# Patient Record
Sex: FEMALE | Race: WHITE | NOT HISPANIC OR LATINO
[De-identification: names, ages, dates, MRNs, and addresses within clinical notes are randomized per-mention and may not be internally consistent; named-entity substitution may affect disease eponyms.]

---

## 2017-03-09 ENCOUNTER — APPOINTMENT (OUTPATIENT)
Dept: OBGYN | Facility: CLINIC | Age: 52
End: 2017-03-09

## 2017-03-09 VITALS
HEIGHT: 64 IN | DIASTOLIC BLOOD PRESSURE: 70 MMHG | BODY MASS INDEX: 22.2 KG/M2 | SYSTOLIC BLOOD PRESSURE: 110 MMHG | WEIGHT: 130 LBS

## 2017-03-11 LAB — HPV HIGH+LOW RISK DNA PNL CVX: NEGATIVE

## 2017-03-13 LAB — CYTOLOGY CVX/VAG DOC THIN PREP: NORMAL

## 2018-04-13 ENCOUNTER — APPOINTMENT (OUTPATIENT)
Dept: OBGYN | Facility: CLINIC | Age: 53
End: 2018-04-13
Payer: COMMERCIAL

## 2018-04-13 ENCOUNTER — APPOINTMENT (OUTPATIENT)
Dept: INTERNAL MEDICINE | Facility: CLINIC | Age: 53
End: 2018-04-13

## 2018-04-13 VITALS
WEIGHT: 132 LBS | HEIGHT: 64 IN | DIASTOLIC BLOOD PRESSURE: 70 MMHG | BODY MASS INDEX: 22.53 KG/M2 | SYSTOLIC BLOOD PRESSURE: 112 MMHG

## 2018-04-13 PROCEDURE — 82270 OCCULT BLOOD FECES: CPT

## 2018-04-13 PROCEDURE — 99396 PREV VISIT EST AGE 40-64: CPT

## 2018-04-16 LAB — HPV HIGH+LOW RISK DNA PNL CVX: NOT DETECTED

## 2018-04-18 LAB — CYTOLOGY CVX/VAG DOC THIN PREP: NORMAL

## 2019-05-02 ENCOUNTER — APPOINTMENT (OUTPATIENT)
Dept: OBGYN | Facility: CLINIC | Age: 54
End: 2019-05-02
Payer: COMMERCIAL

## 2019-05-02 VITALS
HEIGHT: 64 IN | SYSTOLIC BLOOD PRESSURE: 108 MMHG | BODY MASS INDEX: 23.22 KG/M2 | WEIGHT: 136 LBS | DIASTOLIC BLOOD PRESSURE: 56 MMHG

## 2019-05-02 PROCEDURE — 82270 OCCULT BLOOD FECES: CPT

## 2019-05-02 PROCEDURE — 99396 PREV VISIT EST AGE 40-64: CPT

## 2019-05-02 NOTE — PHYSICAL EXAM
[Alert] : alert [Awake] : awake [Soft] : soft [Oriented x3] : oriented to person, place, and time [No Lesions] : no genitalia lesions [Normal] : cervix [Labia Majora] : labia major [No Bleeding] : there was no active vaginal bleeding [Pap Obtained] : a Pap smear was performed [Anteversion] : anteverted [No Tenderness] : no rectal tenderness [Uterine Adnexae] : were not tender and not enlarged [Nl Sphincter Tone] : normal sphincter tone [RRR, No Murmurs] : RRR, no murmurs [CTAB] : CTAB [Acute Distress] : no acute distress [LAD] : no lymphadenopathy [Thyroid Nodule] : no thyroid nodule [Goiter] : no goiter [Mass] : no breast mass [Nipple Discharge] : no nipple discharge [Axillary LAD] : no axillary lymphadenopathy [Tender] : non tender [Distended] : not distended [H/Smegaly] : no hepatosplenomegaly [Depressed Mood] : not depressed [Flat Affect] : affect not flat [Discharge] : had no discharge [Motion Tenderness] : there was no cervical motion tenderness [Tenderness] : nontender [Enlarged ___ wks] : not enlarged [Adnexa Tenderness] : were not tender [Occult Blood] : occult blood test from digital rectal exam was negative

## 2019-05-02 NOTE — COUNSELING
[Vitamins/Supplements] : vitamins/supplements [Breast Self Exam] : breast self exam [Exercise] : exercise

## 2019-05-02 NOTE — HISTORY OF PRESENT ILLNESS
[1 Year Ago] : 1 year ago [Good] : being in good health [Postmenopausal] : is postmenopausal [Healthy Diet] : a healthy diet

## 2019-05-02 NOTE — CHIEF COMPLAINT
[Annual Visit] : annual visit [FreeTextEntry1] : Patient is present for her annual visit. LMP:02/2018. Pt would like to know what to expect while being in menopause. Declined MA

## 2019-05-03 LAB — HPV HIGH+LOW RISK DNA PNL CVX: NOT DETECTED

## 2019-05-07 LAB — CYTOLOGY CVX/VAG DOC THIN PREP: NORMAL

## 2020-06-25 ENCOUNTER — APPOINTMENT (OUTPATIENT)
Dept: OBGYN | Facility: CLINIC | Age: 55
End: 2020-06-25
Payer: COMMERCIAL

## 2020-06-25 VITALS
DIASTOLIC BLOOD PRESSURE: 70 MMHG | HEIGHT: 64 IN | BODY MASS INDEX: 22.71 KG/M2 | WEIGHT: 133 LBS | SYSTOLIC BLOOD PRESSURE: 108 MMHG

## 2020-06-25 LAB
DATE COLLECTED: NORMAL
HEMOCCULT SP1 STL QL: NEGATIVE

## 2020-06-25 PROCEDURE — 82270 OCCULT BLOOD FECES: CPT

## 2020-06-25 PROCEDURE — 99396 PREV VISIT EST AGE 40-64: CPT

## 2020-06-25 NOTE — PHYSICAL EXAM
[Awake] : awake [Alert] : alert [Soft] : soft [Oriented x3] : oriented to person, place, and time [No Lesions] : no genitalia lesions [Normal] : uterus [Labia Majora] : labia major [No Bleeding] : there was no active vaginal bleeding [Pap Obtained] : a Pap smear was performed [Anteversion] : anteverted [Uterine Adnexae] : were not tender and not enlarged [No Tenderness] : no rectal tenderness [Nl Sphincter Tone] : normal sphincter tone [RRR, No Murmurs] : RRR, no murmurs [CTAB] : CTAB [Acute Distress] : no acute distress [LAD] : no lymphadenopathy [Thyroid Nodule] : no thyroid nodule [Goiter] : no goiter [Nipple Discharge] : no nipple discharge [Mass] : no breast mass [Tender] : non tender [Axillary LAD] : no axillary lymphadenopathy [H/Smegaly] : no hepatosplenomegaly [Distended] : not distended [Depressed Mood] : not depressed [Flat Affect] : affect not flat [Discharge] : had no discharge [Motion Tenderness] : there was no cervical motion tenderness [Enlarged ___ wks] : not enlarged [Tenderness] : nontender [Adnexa Tenderness] : were not tender [Occult Blood] : occult blood test from digital rectal exam was negative

## 2020-06-25 NOTE — CHIEF COMPLAINT
[Annual Visit] : annual visit [FreeTextEntry1] : LUCERO TURK a 54 year old female presents in office today for a routine annual exam. LMP was 2017. Last pap smear was done on 05/12/2019. Last mammogram was done on 06/12/2019. Patient denies MOA in the room.

## 2020-06-25 NOTE — HISTORY OF PRESENT ILLNESS
[Healthy Diet] : a healthy diet [1 Year Ago] : 1 year ago [Good] : being in good health [Postmenopausal] : is postmenopausal

## 2020-06-29 LAB — HPV HIGH+LOW RISK DNA PNL CVX: NOT DETECTED

## 2020-06-30 LAB — CYTOLOGY CVX/VAG DOC THIN PREP: ABNORMAL

## 2020-09-23 ENCOUNTER — APPOINTMENT (OUTPATIENT)
Dept: GASTROENTEROLOGY | Facility: CLINIC | Age: 55
End: 2020-09-23
Payer: COMMERCIAL

## 2020-09-23 VITALS
DIASTOLIC BLOOD PRESSURE: 72 MMHG | HEIGHT: 64 IN | BODY MASS INDEX: 22.71 KG/M2 | HEART RATE: 76 BPM | WEIGHT: 133 LBS | SYSTOLIC BLOOD PRESSURE: 120 MMHG | RESPIRATION RATE: 16 BRPM

## 2020-09-23 DIAGNOSIS — Z86.79 PERSONAL HISTORY OF OTHER DISEASES OF THE CIRCULATORY SYSTEM: ICD-10-CM

## 2020-09-23 DIAGNOSIS — Z78.9 OTHER SPECIFIED HEALTH STATUS: ICD-10-CM

## 2020-09-23 DIAGNOSIS — Z86.39 PERSONAL HISTORY OF OTHER ENDOCRINE, NUTRITIONAL AND METABOLIC DISEASE: ICD-10-CM

## 2020-09-23 PROCEDURE — 99203 OFFICE O/P NEW LOW 30 MIN: CPT

## 2020-09-23 RX ORDER — LEVOTHYROXINE SODIUM 25 UG/1
25 TABLET ORAL
Refills: 0 | Status: ACTIVE | COMMUNITY

## 2020-09-23 NOTE — HISTORY OF PRESENT ILLNESS
[FreeTextEntry1] : This is a 54-year-old female with history of celiac disease since age 17 documented by upper endoscopy  with small bowel biopsies presents for evaluation of possible screening colonoscopy. She states that her previous gastroenterologist performed colonoscopies for "celiac disease". Her last colonoscopy Was in 2016 reported to be normal. I asked for a copy of the results faxed to me. She states that she will sign a release form. She denies family history of colon cancer or colon polyps. She states to be on a strict gluten-free diet. She denies abdominal pain, nausea or vomiting. She denies changes in bowel habits. She denies rectal bleeding or melena. She is currently in a modified keto diet and has not lost any significant weight. She was recently told to have hypothyroidism and was told by her primary that this may be the reason why she is not losing much weight on her keto diet. She reports undergoing recent blood work reported to be normal without evidence of anemia. She has not had celiac antibodies drawn in many years.

## 2020-09-23 NOTE — ASSESSMENT
[FreeTextEntry1] : This is a 54-year-old female with history of celiac disease on a strict gluten-free diet. I recommend celiac antibodies. I explained to her that celiac disease is not an indication for colonoscopy. I explained to her that celiac disease is a disease of the small intestine and not the large intestine. Past for copy of her colonoscopy from 2014 faxed to me. If this is unremarkable, then she is not yet due for a screening colonoscopy. I asked for copies of recent blood work faxed to me. She is to call me she's questions or concerns

## 2020-09-24 LAB
ENDOMYSIUM IGA SER QL: NEGATIVE
ENDOMYSIUM IGA TITR SER: NORMAL
TTG IGA SER IA-ACNC: <1.2 U/ML
TTG IGA SER-ACNC: NEGATIVE
TTG IGG SER IA-ACNC: 1.8 U/ML
TTG IGG SER IA-ACNC: NEGATIVE

## 2021-03-10 DIAGNOSIS — Z12.11 ENCOUNTER FOR SCREENING FOR MALIGNANT NEOPLASM OF COLON: ICD-10-CM

## 2021-03-10 DIAGNOSIS — K90.0 CELIAC DISEASE: ICD-10-CM

## 2021-03-10 RX ORDER — SODIUM SULFATE, POTASSIUM SULFATE, MAGNESIUM SULFATE 17.5; 3.13; 1.6 G/ML; G/ML; G/ML
17.5-3.13-1.6 SOLUTION, CONCENTRATE ORAL
Qty: 1 | Refills: 0 | Status: ACTIVE | COMMUNITY
Start: 2021-03-10 | End: 1900-01-01

## 2021-04-25 DIAGNOSIS — Z01.818 ENCOUNTER FOR OTHER PREPROCEDURAL EXAMINATION: ICD-10-CM

## 2021-04-26 ENCOUNTER — APPOINTMENT (OUTPATIENT)
Dept: DISASTER EMERGENCY | Facility: CLINIC | Age: 56
End: 2021-04-26

## 2021-04-26 LAB — SARS-COV-2 N GENE NPH QL NAA+PROBE: NOT DETECTED

## 2021-04-30 ENCOUNTER — APPOINTMENT (OUTPATIENT)
Dept: GASTROENTEROLOGY | Facility: AMBULATORY MEDICAL SERVICES | Age: 56
End: 2021-04-30
Payer: COMMERCIAL

## 2021-04-30 PROCEDURE — 45385 COLONOSCOPY W/LESION REMOVAL: CPT | Mod: 33

## 2021-05-05 ENCOUNTER — TRANSCRIPTION ENCOUNTER (OUTPATIENT)
Age: 56
End: 2021-05-05

## 2021-05-12 ENCOUNTER — NON-APPOINTMENT (OUTPATIENT)
Age: 56
End: 2021-05-12

## 2021-06-29 ENCOUNTER — NON-APPOINTMENT (OUTPATIENT)
Age: 56
End: 2021-06-29

## 2021-07-27 ENCOUNTER — APPOINTMENT (OUTPATIENT)
Dept: OBGYN | Facility: CLINIC | Age: 56
End: 2021-07-27
Payer: COMMERCIAL

## 2021-07-27 VITALS
DIASTOLIC BLOOD PRESSURE: 70 MMHG | HEIGHT: 64 IN | SYSTOLIC BLOOD PRESSURE: 104 MMHG | WEIGHT: 135 LBS | BODY MASS INDEX: 23.05 KG/M2

## 2021-07-27 DIAGNOSIS — Z01.419 ENCOUNTER FOR GYNECOLOGICAL EXAMINATION (GENERAL) (ROUTINE) W/OUT ABNORMAL FINDINGS: ICD-10-CM

## 2021-07-27 PROCEDURE — 82270 OCCULT BLOOD FECES: CPT

## 2021-07-27 PROCEDURE — 99072 ADDL SUPL MATRL&STAF TM PHE: CPT

## 2021-07-27 PROCEDURE — 99396 PREV VISIT EST AGE 40-64: CPT

## 2021-07-27 NOTE — HISTORY OF PRESENT ILLNESS
[FreeTextEntry1] : LUCERO TURK a 55 year old female presents in office today for a routine annual exam. LMP was 2018. Last pap smear was done on 06/25/2020. Last mammogram was done on 07/21/2020. Patient denies MOA in the room.\par  [TextBox_4] : Patient is here for a yearly exam. Has no GYN complaints or issues currently. Normal urination and bowel function. No abnormal vaginal bleeding or staining. Past medical, surgical and family history reviewed and updated.

## 2021-07-27 NOTE — PHYSICAL EXAM
[Appropriately responsive] : appropriately responsive [Alert] : alert [No Acute Distress] : no acute distress [No Lymphadenopathy] : no lymphadenopathy [Regular Rate Rhythm] : regular rate rhythm [No Murmurs] : no murmurs [Clear to Auscultation B/L] : clear to auscultation bilaterally [Soft] : soft [Non-tender] : non-tender [Non-distended] : non-distended [No HSM] : No HSM [No Lesions] : no lesions [No Mass] : no mass [Oriented x3] : oriented x3 [Examination Of The Breasts] : a normal appearance [No Masses] : no breast masses were palpable [Labia Majora] : normal [Labia Minora] : normal [Normal] : normal [Anteversion] : anteverted [Uterine Adnexae] : normal [No Tenderness] : no tenderness [Nl Sphincter Tone] : normal sphincter tone [Tenderness] : nontender [Enlarged ___ wks] : not enlarged [Occult Blood Positive] : was negative for occult blood analysis

## 2021-07-30 LAB
DATE COLLECTED: NORMAL
HEMOCCULT SP1 STL QL: NEGATIVE
HPV HIGH+LOW RISK DNA PNL CVX: NOT DETECTED
QUALITY CONTROL: YES

## 2021-08-02 LAB — CYTOLOGY CVX/VAG DOC THIN PREP: ABNORMAL
